# Patient Record
Sex: MALE | Race: WHITE | NOT HISPANIC OR LATINO | Employment: OTHER | URBAN - METROPOLITAN AREA
[De-identification: names, ages, dates, MRNs, and addresses within clinical notes are randomized per-mention and may not be internally consistent; named-entity substitution may affect disease eponyms.]

---

## 2018-09-22 ENCOUNTER — OFFICE VISIT (OUTPATIENT)
Dept: URGENT CARE | Facility: PHYSICIAN GROUP | Age: 83
End: 2018-09-22
Payer: MEDICARE

## 2018-09-22 VITALS
HEART RATE: 75 BPM | RESPIRATION RATE: 14 BRPM | TEMPERATURE: 97.9 F | DIASTOLIC BLOOD PRESSURE: 80 MMHG | OXYGEN SATURATION: 96 % | WEIGHT: 169 LBS | SYSTOLIC BLOOD PRESSURE: 138 MMHG

## 2018-09-22 DIAGNOSIS — M54.2 CERVICAL PAIN (NECK): ICD-10-CM

## 2018-09-22 DIAGNOSIS — Z76.0 MEDICATION REFILL: ICD-10-CM

## 2018-09-22 PROCEDURE — 99204 OFFICE O/P NEW MOD 45 MIN: CPT | Performed by: NURSE PRACTITIONER

## 2018-09-22 RX ORDER — DONEPEZIL HYDROCHLORIDE 23 MG/1
1 TABLET, FILM COATED ORAL NIGHTLY PRN
Qty: 5 TAB | Refills: 0 | Status: SHIPPED | OUTPATIENT
Start: 2018-09-22

## 2018-09-22 RX ORDER — ROSUVASTATIN CALCIUM 5 MG/1
5 TABLET, COATED ORAL EVERY EVENING
COMMUNITY

## 2018-09-22 RX ORDER — OMEPRAZOLE 20 MG/1
20 CAPSULE, DELAYED RELEASE ORAL DAILY
COMMUNITY

## 2018-09-22 ASSESSMENT — ENCOUNTER SYMPTOMS
MYALGIAS: 0
WEAKNESS: 0
EYE PAIN: 0
NECK PAIN: 1
NAUSEA: 0
PARESIS: 0
FEVER: 0
VISUAL CHANGE: 0
DIZZINESS: 0
SHORTNESS OF BREATH: 0
SORE THROAT: 0
HEADACHES: 0
VOMITING: 0
CHILLS: 0
PHOTOPHOBIA: 0
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
## 2018-10-04 ENCOUNTER — HOSPITAL ENCOUNTER (OUTPATIENT)
Dept: HOSPITAL 62 - RAD | Age: 83
End: 2018-10-04
Attending: INTERNAL MEDICINE
Payer: MEDICARE

## 2018-10-04 DIAGNOSIS — R13.10: ICD-10-CM

## 2018-10-04 DIAGNOSIS — R05: ICD-10-CM

## 2018-10-04 DIAGNOSIS — J69.0: Primary | ICD-10-CM

## 2018-10-04 PROCEDURE — 92611 MOTION FLUOROSCOPY/SWALLOW: CPT

## 2018-10-04 PROCEDURE — 74230 X-RAY XM SWLNG FUNCJ C+: CPT

## 2018-10-04 NOTE — ST MODIFIED BARIUM SWALLOW
Recommendation





- Recommendations


Recommendations: Recommend dry swallows during meals to reduce residue. No diet 

change recommendations indicated at this time.





Medical Diagnoses





- Medical Diagnoses


Medical Diagnosis Description & ICD-10 Code(s): R05 cough, J69.0 aspiration 

pneumonia, R13.10 dysphagia


Other Medical Diagnoses/Co-Morbidities: per patient report no significant 

history of respiratory or GI issues, no history of CVA or head/neck related 

injury of surgery.





ST Modified Barium Swallow





- General


Date: 10/04/18


Referring Physician: Dr. Umanzor


Risks/Precautions: None


Date of Onset: 08/01/18 - approximate onset date


Reason for Referral: pneumonia





- History


History obtained from: Patient


-: Medical - Patient arrived independently and acted as his own historian. Per 

patient report, foods and liquids will "go down wrong" approximately 1x per 

day. He reports recently coughing with thick dark mucus being expelled, but 

states that this has stopped since his doctor gave him augmentin. Patient 

reports most recent pneumonia was last winter, does not report frequent 

pneumonia or bronchitis.


Medications: per patient report: crestor, donapezil, foltanx, ipratopium, 

rosuvestatin, naproxen.


Allergies: none reported





- Functional Status


Prior Functional Status: INDEPENDENT: feeding - independent


Current Functional Limitations: feeding - occasional coughing





- Subjective


Patient/caregiver goal(s): r/o aspiration


Cognitive-Linguistic Function: WNL


Speech Intelligibility: WNL


Current Nutritional Means: PO


Current PO diet: Regular


Current symptoms: Coughing, Pneumonia


Pain: Patient reports, 0/5





- Objective


Assessment: Upright, Left Lateral





- Food Trials Used


Food trials used: Thin liquids, Pureed, Regular


The patient: Was Able to Self Feed





- Oral-Motor Skills


Dentition: Full


Laryngeal Function: Volitional Cough - WNL, Volitional Swallow - WNL





- Assessment


Oral prep: Normal


Labial closure: Adequate


Leakage: None


Mastication: Adequate


Lingual Movement: Normal


Oral stage: Normal for this Procedure





- Pharyngeal Stage


Initiation of Pharyngeal Stage Reflex: Normal


Decreased laryngeal elevation: No


Reduced Velopharyngeal Closure: no


Reduced pressure generation: No


reduced tongue-based retraction: No


Pre-swallow pooling in valleculae: Mild


Pre-Swallow pooling in pyriforms: None


Reduced Thyro-Hyoid approximation: No


Reduced epiglottic excursion: No


Reduced pharyngeal peristalsis/contraction: No


Multiple Swallows with: Cleared w/ Dry Swallow


Post-swallow residulas vallecular: Mild


Post-Swallow residuals in pyriforms: Mild - with pudding trials only


Reduced Cricopharyngeal opening: No





- Fall Risk Assessment


Medications/Conditions that increase fall risks include: Antidepressants, 

sedatives, anti-arrhythmic, diuretic, benzodiazipenes, neuroleptics.  BP 

regulation problems, cardiac problems, balance or gait deficits, neurological 

problems.


Fall Risk Actions Taken: No action needed





- Behavioral Observations


During evaluation process patient: was pleasant, was cooperative, able to 

answer questions





- Treatment / Educational Needs:


Treatment/Education Needs: Treatment consisted of patient education on the role 

of the Speech Pathologist.  Patient's plan of care and golas were communicated 

as well as scheduling and attendance policies.  Recommendations for initial 

home program were shared.  Patient demonstrated understanding and verbalized 

agreement.





- Impression/Summary


Laryngeal Penetration: No


Tracheal Aspiration: no


Effective compensatory strategies: hard swallow - cleared residue


Patient presents with: Normal swallow at eval


Risk of Aspiration: Minimal


Evaluation and Findings: Patient presents with overall normal swallow function. 

Mild valleculae and pyriform sinus residue seen with solid trials, however, 

this cleared easily with dry hard swallow. No signs of aspiration or 

penetration on this study.





- Recommendations


NPO: no


Solid diet recommendations: Regular


Liquid Diet Modification: Thin


Pt/Family education and followup with MD: Yes


Dysphagia therapy with SLP: no


Recommended techniques: Fully Upright During Meal, Dry Swallow After Bite, 

Small Bites and Sips


Supervision: Independent


Information, Precautions and Recommendations: Patient (Written), Patient (Verbal

)





- Plan of Care


Strategies to optimize patient understanding include:: ongoing assessment of 

educational needs, implementation of educational strategies, and re-education.





- -


-: Thank you for the opportunity to work with this patient and his/her family.  

Should you have any questions about this patient's plan or progress, I can be 

reached at [phone].





Charge G Code?





- -


-: Yes





ST F.L. Impairment Category





- Rationale Based On


Rationale Based On: Clin Find., Obj Measures





- Swallowing


Current : CH 0% Impaired


Goal : CH 0% Impaired


Discharge : CH 0% Impaired

## 2018-10-04 NOTE — RADIOLOGY REPORT (SQ)
EXAM DESCRIPTION:  COOKIE SWALLOW



COMPLETED DATE/TIME:  10/4/2018 9:00 am



REASON FOR STUDY:  COUGH (R05), ASPIRATION PNEUMONIA (J69.0) J69.0  PNEUMONITIS DUE TO INHALATION OF 
FOOD AND VOMIT R05  COUGH



COMPARISON:  None.



TECHNIQUE:  Videofluoroscopic swallowing examination was performed in conjunction with speech patholo
gy.  Videofluoroscopic imaging was obtained and reviewed and these are the findings:



RADIATION DOSE:  Fluoro time 1.26 minutes

1 images saved to PACS.



LIMITATIONS:  None



FINDINGS:  The patient was brought into the fluoro room and placed upright on a modified barium swall
ow chair.  The patient was then given multiple consistencies mixed with barium to swallow under live 
fluoroscopic video guidance.  According to the Speech Pathologist there was no penetration or aspirat
ion. Please refer to speech pathology report for further details.



IMPRESSION:  NO EVIDENCE OF PENETRATION OR ASPIRATION.PLEASE SEE SPEECH PATHOLOGIST REPORT FOR OTHER 
FINDINGS AND RECOMMENDATIONS.



COMMENT:  None

Quality :  Final reports for procedures using fluoroscopy that document radiation exposure semaj
ephraim, or exposure time and number of fluorographic images (if radiation exposure indices are not avail
able)



TECHNICAL DOCUMENTATION:  JOB ID: [id number]

 2011 [employer]- All Rights Reserved



Reading location - IP/workstation name: [email or web address] [id number]

## 2019-07-02 ENCOUNTER — HOSPITAL ENCOUNTER (EMERGENCY)
Dept: HOSPITAL 62 - ER | Age: 84
LOS: 1 days | Discharge: HOME | End: 2019-07-03
Payer: MEDICARE

## 2019-07-02 DIAGNOSIS — R04.0: Primary | ICD-10-CM

## 2019-07-02 DIAGNOSIS — Z87.891: ICD-10-CM

## 2019-07-02 DIAGNOSIS — Z79.01: ICD-10-CM

## 2019-07-02 DIAGNOSIS — I48.91: ICD-10-CM

## 2019-07-02 PROCEDURE — 30901 CONTROL OF NOSEBLEED: CPT

## 2019-07-02 PROCEDURE — 99284 EMERGENCY DEPT VISIT MOD MDM: CPT

## 2019-07-03 ENCOUNTER — HOSPITAL ENCOUNTER (EMERGENCY)
Dept: HOSPITAL 62 - ER | Age: 84
Discharge: HOME | End: 2019-07-03
Payer: MEDICARE

## 2019-07-03 VITALS — SYSTOLIC BLOOD PRESSURE: 148 MMHG | DIASTOLIC BLOOD PRESSURE: 66 MMHG

## 2019-07-03 VITALS — DIASTOLIC BLOOD PRESSURE: 63 MMHG | SYSTOLIC BLOOD PRESSURE: 141 MMHG

## 2019-07-03 DIAGNOSIS — Z79.01: ICD-10-CM

## 2019-07-03 DIAGNOSIS — I48.91: ICD-10-CM

## 2019-07-03 DIAGNOSIS — R04.0: Primary | ICD-10-CM

## 2019-07-03 DIAGNOSIS — Z87.891: ICD-10-CM

## 2019-07-03 LAB
ABSOLUTE LYMPHOCYTES# (MANUAL): 1.5 10^3/UL (ref 0.5–4.7)
ABSOLUTE MONOCYTES # (MANUAL): 1.3 10^3/UL (ref 0.1–1.4)
ADD MANUAL DIFF: YES
ALBUMIN SERPL-MCNC: 4.2 G/DL (ref 3.5–5)
ALP SERPL-CCNC: 59 U/L (ref 38–126)
ALT SERPL-CCNC: 29 U/L (ref 21–72)
ANION GAP SERPL CALC-SCNC: 11 MMOL/L (ref 5–19)
ANISOCYTOSIS BLD QL SMEAR: SLIGHT
APTT BLD: 36 SEC (ref 23.5–35.8)
AST SERPL-CCNC: 24 U/L (ref 17–59)
BASO STIPL BLD QL SMEAR: PRESENT
BASOPHILS NFR BLD MANUAL: 0 % (ref 0–2)
BILIRUB DIRECT SERPL-MCNC: 0.2 MG/DL (ref 0–0.4)
BILIRUB SERPL-MCNC: 1.4 MG/DL (ref 0.2–1.3)
BUN SERPL-MCNC: 21 MG/DL (ref 7–20)
CALCIUM: 9 MG/DL (ref 8.4–10.2)
CHLORIDE SERPL-SCNC: 105 MMOL/L (ref 98–107)
CO2 SERPL-SCNC: 26 MMOL/L (ref 22–30)
EOSINOPHIL NFR BLD MANUAL: 1 % (ref 0–6)
ERYTHROCYTE [DISTWIDTH] IN BLOOD BY AUTOMATED COUNT: 18.5 % (ref 11.5–14)
GLUCOSE SERPL-MCNC: 84 MG/DL (ref 75–110)
HCT VFR BLD CALC: 32.8 % (ref 37.9–51)
HGB BLD-MCNC: 10.8 G/DL (ref 13.5–17)
INR PPP: 1.32
MCH RBC QN AUTO: 29.8 PG (ref 27–33.4)
MCHC RBC AUTO-ENTMCNC: 32.9 G/DL (ref 32–36)
MCV RBC AUTO: 91 FL (ref 80–97)
MONOCYTES % (MANUAL): 21 % (ref 3–13)
NEUTS BAND NFR BLD MANUAL: 2 % (ref 3–5)
PLATELET # BLD: 238 10^3/UL (ref 150–450)
PLATELET COMMENT: ADEQUATE
POIKILOCYTOSIS BLD QL SMEAR: SLIGHT
POLYCHROMASIA BLD QL SMEAR: SLIGHT
POTASSIUM SERPL-SCNC: 4.7 MMOL/L (ref 3.6–5)
PROT SERPL-MCNC: 6.6 G/DL (ref 6.3–8.2)
PROTHROMBIN TIME: 16.5 SEC (ref 11.4–15.4)
RBC # BLD AUTO: 3.62 10^6/UL (ref 4.35–5.55)
SEGMENTED NEUTROPHILS % (MAN): 52 % (ref 42–78)
SODIUM SERPL-SCNC: 142 MMOL/L (ref 137–145)
TOTAL CELLS COUNTED BLD: 100
VARIANT LYMPHS NFR BLD MANUAL: 24 % (ref 13–45)
WBC # BLD AUTO: 6.4 10^3/UL (ref 4–10.5)

## 2019-07-03 PROCEDURE — 99283 EMERGENCY DEPT VISIT LOW MDM: CPT

## 2019-07-03 PROCEDURE — 36415 COLL VENOUS BLD VENIPUNCTURE: CPT

## 2019-07-03 PROCEDURE — 85610 PROTHROMBIN TIME: CPT

## 2019-07-03 PROCEDURE — 85025 COMPLETE CBC W/AUTO DIFF WBC: CPT

## 2019-07-03 PROCEDURE — 80053 COMPREHEN METABOLIC PANEL: CPT

## 2019-07-03 PROCEDURE — 85730 THROMBOPLASTIN TIME PARTIAL: CPT

## 2019-07-03 NOTE — ER DOCUMENT REPORT
ED General





- General


Chief Complaint: Nose Bleed


Stated Complaint: NOSE BLEED


Time Seen by Provider: 07/03/19 00:37


Primary Care Provider: 


TERRENCE COOPER DO [Primary Care Provider] - Follow up as needed


Notes: 





Patient is an 89-year-old male with past medical history of atrial fibrillation 

anticoagulated on [place], presents from nursing facility due to 

concerns of epistaxis from the left nostril.  Started approximately 3 hours ago 

and has been ongoing since that time.  Direct pressure applied with no relief.  

No clear trigger.  No history of similar bleeds in the past.  Patient denies any

difficult he breathing, difficulty swallowing.  No trauma to the nose.  No fever

or constitutional symptoms.  Does arrive by EMS.  Regards symptoms as being 

severe.


TRAVEL OUTSIDE OF THE [place] IN LAST 30 DAYS: No





Past Medical History





- General


Information source: Patient





- Social History


Smoking Status: Former Smoker


Frequency of alcohol use: Occasional


Drug Abuse: None


Lives with: Nursing Home


Family History: Reviewed & Not Pertinent


Patient has suicidal ideation: No


Patient has homicidal ideation: No


Renal/ Medical History: Denies: Hx Peritoneal Dialysis





Review of Systems





- Review of Systems


Notes: 





Constitutional: Negative for fever.


HENT: Positive for left-sided epistaxis


Eyes: Negative for visual changes.


Cardiovascular: Negative for chest pain.


Respiratory: Negative for shortness of breath.


Gastrointestinal: Negative for abdominal pain, vomiting or diarrhea.


Genitourinary: Negative for dysuria.


Musculoskeletal: Negative for back pain.


Skin: Negative for rash.


Neurological: Negative for headaches, weakness or numbness.





10 point ROS negative except as marked above and in HPI.





Physical Exam





- Vital signs


Vitals: 


                                        











Temp Pulse Resp BP


 


 97.1 F   54 L  16   172/61 H


 


 07/02/19 23:34  07/02/19 23:34  07/02/19 23:34  07/02/19 23:34











Interpretation: Hypertensive


Notes: 





PHYSICAL EXAMINATION:





GENERAL: Elderly male in no distress





HEAD: Atraumatic, normocephalic.





EYES: Pupils equal round and reactive to light, extraocular movements intact, 

sclera anicteric, conjunctiva are normal.





ENT: Mildly brisk epistaxis from the left nostril, left nostril is completely 

occluded by a large clot, oropharynx clear without exudates.  Moist mucous 

membranes.





NECK: Normal range of motion, supple without lymphadenopathy





LUNGS: Breath sounds clear to auscultation bilaterally and equal.  No wheezes 

rales or rhonchi.





HEART: Irregularly irregular bradycardia without murmurs





ABDOMEN: Soft, nontender, normoactive bowel sounds.  No guarding, no rebound.  

No masses appreciated.





EXTREMITIES: Normal range of motion, no pitting or edema.  No cyanosis.





NEUROLOGICAL: No focal neurological deficits. Moves all extremities 

spontaneously and on command.





PSYCH: Alert, oriented to person and place





SKIN: Warm, Dry, normal turgor, no rashes or lesions noted.





Course





- Re-evaluation


Re-evalutation: 





07/03/19 01:07


Patient presents with a moderately brisk bleed from the left nose.  A large clot

measuring approximately 4 inches was extracted from the nostril.  After this 

clot was removed the nasal cavity was evaluated using an otoscope.  I did 

visualize a small area of what appeared to be an abrasion that was bleeding.  

Silver nitrate was applied to the area with successful cauterization.  No 

indication for labs.  Patient is otherwise well in appearance.  Will plan for 

discharge back to the nursing facility.


07/03/19 03:56


Shortly after transport arrived patient began having recurrent epistaxis.  TXA 

was atomized and this likewise did not resolve the bleeding.  There again was a 

small area that appeared to continuously bleed and silver nitrate stick was 

applied again with unsuccessful termination.  At this point a 5.5 cm nasal 

rocket was introduced although despite multiple times I could not successfully 

placed the entirety of the Rhino Rocket into the nasal passage only being able 

to insert about 1/2 cm before meeting significant resistance despite going at 

multiple angles of insertion.  This did however successfully terminate the 

bleed.  Patient will be started on cephalexin prophylaxis.





- Vital Signs


Vital signs: 


                                        











Temp Pulse Resp BP Pulse Ox


 


 97.8 F   58 L  20   141/63 H  99 


 


 07/03/19 03:20  07/03/19 03:20  07/03/19 03:20  07/03/19 03:20  07/03/19 03:20














Discharge





- Discharge


Clinical Impression: 


 Epistaxis, Anticoagulated





Condition: Good


Disposition: HOME, SELF-CARE


Additional Instructions: 


Your nasal packing placed today.  This should be removed in 24 to 48 hours by 

deflating the cuff and gently pulling on the end of the packing.  Take 

antibiotics as prescribed.  Please also return if you pass out, have significant

pain of the nose or face, began bleeding around the packing, or any other 

symptoms that are concerning to you.  Your primary care doctor regarding today's

visit.








Prescriptions: 


Cephalexin Monohydrate [Keflex 500 mg Capsule] 500 mg PO Q6H 5 Days  capsule


Referrals: 


TERRENCE COOPER DO [Primary Care Provider] - Follow up as needed


MEME BARNEY DO [ASSOCIATE] - Follow up in 3-5 days

## 2019-07-03 NOTE — ER DOCUMENT REPORT
ED ENT





- General


Chief Complaint: Nose Bleed


Stated Complaint: NOSE BLEED


Time Seen by Provider: 07/03/19 10:27


Primary Care Provider: 


TERRENCE COOPER DO [Primary Care Provider] - Follow up as needed


TRAVEL OUTSIDE OF THE [place] IN LAST 30 DAYS: No





- Related Data


Allergies/Adverse Reactions: 


                                        





No Known Allergies Allergy (Unverified 07/03/19 09:44)


   











Past Medical History





- Social History


Family History: Reviewed & Not Pertinent


Renal/ Medical History: Denies: Hx Peritoneal Dialysis





Physical Exam





- Vital signs


Vitals: 


                                        











Temp Pulse BP Pulse Ox


 


 97.3 F   61   167/78 H  94 


 


 07/03/19 11:02  07/03/19 11:02  07/03/19 11:02  07/03/19 11:02














Course





- Vital Signs


Vital signs: 


                                        











Temp Pulse Resp BP Pulse Ox


 


 97.3 F   61      167/78 H  100 


 


 07/03/19 11:02  07/03/19 11:02     07/03/19 11:02  07/03/19 11:02














- Laboratory


Result Diagrams: 


                                 07/03/19 11:07





                                 07/03/19 11:07


Laboratory results interpreted by me: 


                                        











  07/03/19 07/03/19 07/03/19





  11:07 11:07 11:07


 


RBC   3.62 L 


 


Hgb   10.8 L 


 


Hct   32.8 L 


 


RDW   18.5 H 


 


Band Neutrophils %   2 L 


 


Monocytes % (Manual)   21 H 


 


PT    16.5 H


 


APTT    36.0 H


 


BUN  21 H  


 


Total Bilirubin  1.4 H  














Discharge





- Discharge


Clinical Impression: 


 Epistaxis





Condition: Good


Disposition: HOME, SELF-CARE


Additional Instructions: 


f/u with ENT in 1-3 days to have the nasal packing removed. continue to take 

your meds prescribed. continue to take the keflex you were given last night. 

return for any continued rebleeding that won't stop or any other concerning 

symptoms. 


Referrals: 


TERRENCE COOPER DO [Primary Care Provider] - Follow up as needed


MEME BARNEY DO [ASSOCIATE] - Follow up tomorrow


[facility] [Provider Group] - Follow up tomorrow


STACEY BERNSTEIN MD [ACTIVE STAFF] - Follow up tomorrow